# Patient Record
Sex: MALE | Race: WHITE | HISPANIC OR LATINO | URBAN - METROPOLITAN AREA
[De-identification: names, ages, dates, MRNs, and addresses within clinical notes are randomized per-mention and may not be internally consistent; named-entity substitution may affect disease eponyms.]

---

## 2024-04-07 ENCOUNTER — HOSPITAL ENCOUNTER (EMERGENCY)
Facility: HOSPITAL | Age: 29
Discharge: HOME | End: 2024-04-07
Attending: EMERGENCY MEDICINE | Admitting: EMERGENCY MEDICINE

## 2024-04-07 VITALS
HEART RATE: 78 BPM | OXYGEN SATURATION: 98 % | WEIGHT: 198.41 LBS | BODY MASS INDEX: 26.87 KG/M2 | RESPIRATION RATE: 16 BRPM | TEMPERATURE: 97.4 F | SYSTOLIC BLOOD PRESSURE: 130 MMHG | HEIGHT: 72 IN | DIASTOLIC BLOOD PRESSURE: 82 MMHG

## 2024-04-07 DIAGNOSIS — H18.891 CORNEAL RUST RING OF RIGHT EYE: Primary | ICD-10-CM

## 2024-04-07 PROCEDURE — 99283 EMERGENCY DEPT VISIT LOW MDM: CPT

## 2024-04-07 RX ORDER — POLYMYXIN B SULFATE AND TRIMETHOPRIM 1; 10000 MG/ML; [USP'U]/ML
1 SOLUTION OPHTHALMIC EVERY 4 HOURS
Qty: 10 ML | Refills: 0 | Status: SHIPPED | OUTPATIENT
Start: 2024-04-07 | End: 2024-04-14

## 2024-04-07 RX ORDER — TETRACAINE HYDROCHLORIDE 5 MG/ML
2 SOLUTION OPHTHALMIC ONCE
Status: COMPLETED | OUTPATIENT
Start: 2024-04-07 | End: 2024-04-07

## 2024-04-07 RX ADMIN — FLUORESCEIN SODIUM 1 STRIP: 1 STRIP OPHTHALMIC at 11:46

## 2024-04-07 RX ADMIN — TETRACAINE HYDROCHLORIDE 2 DROP: 5 SOLUTION OPHTHALMIC at 11:44
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
## 2024-04-07 NOTE — ED NOTES
Pt to ED with c/o pain to R eye, states he was working with wood and thinks may have gotten a splinter in his eye.

## 2024-04-07 NOTE — ED PROVIDER NOTES
MD ATTESTATION NOTE    SHARED VISIT: This visit was performed by BOTH a physician and an APC. The substantive portion of the medical decision making was performed by this attesting physician who made or approved the management plan and takes responsibility for patient management. All studies documented in the APC note (if performed) were independently interpreted by me.    The JOHANNY and I have discussed this patient's history, physical exam, MDM, and treatment plan.  I have reviewed the documentation and personally had a face to face interaction with the patient. The attached note describes my personal findings.      Rohan Coon is a 28 y.o. male who presents to the ED c/o acute right eye pain and redness.  This began 2 days ago.  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  He reports having pain in his eye that improved significantly with topical tetracaine that was applied earlier in the ER.  He does not use any contact lenses.    On exam:  GENERAL: not distressed  HENT: nares patent  EYES: no scleral icterus, right conjunctival injection, he has a rust ring in the right eye at approximately 4 o'clock position  CV: regular rhythm, regular rate  RESPIRATORY: normal effort  ABDOMEN: soft  MUSCULOSKELETAL: no deformity  NEURO: alert, moves all extremities, follows commands  SKIN: warm, dry    Labs  No results found for this or any previous visit (from the past 24 hour(s)).    Radiology  No Radiology Exams Resulted Within Past 24 Hours    Medications given in the ED:  Medications   tetracaine (ALTACAINE) 0.5 % ophthalmic solution 2 drop (2 drops Right Eye Given by Other 4/7/24 1144)   fluorescein ophthalmic strip 1 strip (1 strip Right Eye Given by Other 4/7/24 1146)       Orders placed during this visit:  Orders Placed This Encounter   Procedures    Visual acuity testing  Misc Nursing Order (Specify)       Medical Decision Making:  ED Course as of 04/07/24 1403   Sun Apr 07,  2024   1354 Visual acuity 20/20 OU [TD]      ED Course User Index  [TD] Mitchell Rayo II, MD     Discharge home with antibiotics.  Will follow-up with ophthalmology.    Diagnosis  Final diagnoses:   Corneal rust ring of right eye       DISCHARGE    FOLLOW-UP  Rosalinda Zuniga MD  [address] [facility] [place] [address]  [phone]    Call in 1 day  para hacer mike suzi         Medication List        New Prescriptions      trimethoprim-polymyxin b 66556-0.1 UNIT/ML-% ophthalmic solution  Commonly known as: POLYTRIM  Apply 1 drop to eye(s) as directed by provider Every 4 (Four) Hours for 7 days.               Where to Get Your Medications        You can get these medications from any pharmacy    Bring a paper prescription for each of these medications  trimethoprim-polymyxin b 15559-0.1 UNIT/ML-% ophthalmic solution              Mitchell Rayo II, MD  04/07/24 8492